# Patient Record
Sex: MALE
[De-identification: names, ages, dates, MRNs, and addresses within clinical notes are randomized per-mention and may not be internally consistent; named-entity substitution may affect disease eponyms.]

---

## 2018-01-11 ENCOUNTER — HOSPITAL ENCOUNTER (EMERGENCY)
Dept: HOSPITAL 42 - ED | Age: 28
LOS: 1 days | Discharge: HOME | End: 2018-01-12
Payer: COMMERCIAL

## 2018-01-11 VITALS — BODY MASS INDEX: 29.7 KG/M2

## 2018-01-11 VITALS — RESPIRATION RATE: 18 BRPM | TEMPERATURE: 98.5 F

## 2018-01-11 DIAGNOSIS — R10.11: Primary | ICD-10-CM

## 2018-01-11 LAB
ALBUMIN SERPL-MCNC: 4.5 G/DL (ref 3–4.8)
ALBUMIN/GLOB SERPL: 1.3 {RATIO} (ref 1.1–1.8)
ALT SERPL-CCNC: 46 U/L (ref 7–56)
APTT BLD: 27.8 SECONDS (ref 25.1–36.5)
AST SERPL-CCNC: 36 U/L (ref 17–59)
BASOPHILS # BLD AUTO: 0.02 K/MM3 (ref 0–2)
BASOPHILS NFR BLD: 0.2 % (ref 0–3)
BUN SERPL-MCNC: 13 MG/DL (ref 7–21)
CALCIUM SERPL-MCNC: 9.4 MG/DL (ref 8.4–10.5)
EOSINOPHIL # BLD: 0.1 10*3/UL (ref 0–0.7)
EOSINOPHIL NFR BLD: 0.4 % (ref 1.5–5)
ERYTHROCYTE [DISTWIDTH] IN BLOOD BY AUTOMATED COUNT: 12.1 % (ref 11.5–14.5)
GFR NON-AFRICAN AMERICAN: > 60
GRANULOCYTES # BLD: 9.41 10*3/UL (ref 1.4–6.5)
GRANULOCYTES NFR BLD: 75.8 % (ref 50–68)
HGB BLD-MCNC: 14.9 G/DL (ref 14–18)
INR PPP: 1.06 (ref 0.93–1.08)
LIPASE SERPL-CCNC: 72 U/L (ref 23–300)
LYMPHOCYTES # BLD: 2 10*3/UL (ref 1.2–3.4)
LYMPHOCYTES NFR BLD AUTO: 16.4 % (ref 22–35)
MCH RBC QN AUTO: 31 PG (ref 25–35)
MCHC RBC AUTO-ENTMCNC: 35.3 G/DL (ref 31–37)
MCV RBC AUTO: 87.7 FL (ref 80–105)
MONOCYTES # BLD AUTO: 0.9 10*3/UL (ref 0.1–0.6)
MONOCYTES NFR BLD: 7.2 % (ref 1–6)
PLATELET # BLD: 168 10^3/UL (ref 120–450)
PMV BLD AUTO: 9.4 FL (ref 7–11)
PROTHROMBIN TIME: 12.1 SECONDS (ref 9.4–12.5)
RBC # BLD AUTO: 4.81 10^6/UL (ref 3.5–6.1)
WBC # BLD AUTO: 12.4 10^3/UL (ref 4.5–11)

## 2018-01-11 PROCEDURE — 74177 CT ABD & PELVIS W/CONTRAST: CPT

## 2018-01-11 PROCEDURE — 85730 THROMBOPLASTIN TIME PARTIAL: CPT

## 2018-01-11 PROCEDURE — 83690 ASSAY OF LIPASE: CPT

## 2018-01-11 PROCEDURE — 71045 X-RAY EXAM CHEST 1 VIEW: CPT

## 2018-01-11 PROCEDURE — 96365 THER/PROPH/DIAG IV INF INIT: CPT

## 2018-01-11 PROCEDURE — 85025 COMPLETE CBC W/AUTO DIFF WBC: CPT

## 2018-01-11 PROCEDURE — 80053 COMPREHEN METABOLIC PANEL: CPT

## 2018-01-11 PROCEDURE — 99284 EMERGENCY DEPT VISIT MOD MDM: CPT

## 2018-01-11 PROCEDURE — 96375 TX/PRO/DX INJ NEW DRUG ADDON: CPT

## 2018-01-11 PROCEDURE — 85610 PROTHROMBIN TIME: CPT

## 2018-01-11 NOTE — ED PDOC
Arrival/HPI





- General


Chief Complaint: Abdominal Pain


Time Seen by Provider: 18 20:25


Historian: Patient





- History of Present Illness


Narrative History of Present Illness (Text): 





18 20:38


This 28 yo male with pmh chronic epigastric pain, presents to this ED c/o 

epigastric pain x 2 years.  Patient stated epigastric pain has worsen within 

last 2-3 weeks.  Patient has been taking ibuprofen for pain a few times before.

  Patient denies sob, cp, palpitation, rectal bleeding, fever, rash, dizziness, 

urinary symptoms, or abnormal gait.


Time/Duration: Other (see hpi)


Quality: Gas Like


Context: Home





Past Medical History





- Provider Review


Nursing Documentation Reviewed: Yes





- Infectious Disease


Hx of Infectious Diseases: None





- Psychiatric


Hx Psychophysiologic Disorder: No


Hx Substance Use: No





- Anesthesia


Hx Anesthesia: No





- Suicidal Assessment


Feels Threatened In Home Enviroment: No





Family/Social History





- Physician Review


Nursing Documentation Reviewed: Yes


Family/Social History: Other (noncontributory)


Smoking Status: Never Smoked


Hx Alcohol Use: No


Hx Substance Use: No





Allergies/Home Meds


Allergies/Adverse Reactions: 


Allergies





No Known Allergies Allergy (Verified 18 19:46)


 











Review of Systems





- Review of Systems


Constitutional: Normal.  absent: Fatigue, Weight Change, Fevers


Eyes: Normal


ENT: Normal


Respiratory: Normal


Cardiovascular: Normal


Gastrointestinal: Abdominal Pain.  absent: Nausea, Vomiting


Genitourinary Male: Normal


Musculoskeletal: Normal


Skin: Normal


Neurological: Normal


Endocrine: Normal


Hemo/Lymphatic: Normal


Psychiatric: Normal





Physical Exam


Vital Signs











  Temp Pulse Resp BP Pulse Ox


 


 18 19:46  98.5 F  80  18  130/84  97











Temperature: Afebrile


Blood Pressure: Normal


Pulse: Regular


Respiratory Rate: Normal


Appearance: Positive for: Well-Appearing, Non-Toxic, Comfortable


Pain Distress: None


Mental Status: Positive for: Alert and Oriented X 3





- Systems Exam


Head: Present: Atraumatic, Normocephalic


Pupils: Present: PERRL


Extroacular Muscles: Present: EOMI


Conjunctiva: Present: Normal


Mouth: Present: Moist Mucous Membranes


Neck: Present: Normal Range of Motion


Respiratory/Chest: Present: Clear to Auscultation, Good Air Exchange.  No: 

Respiratory Distress, Accessory Muscle Use


Cardiovascular: Present: Regular Rate and Rhythm, Normal S1, S2.  No: Murmurs


Abdomen: Present: Normal Bowel Sounds.  No: Tenderness, Distention, Peritoneal 

Signs, Rebound, Guarding


Back: Present: Normal Inspection


Upper Extremity: Present: Normal Inspection.  No: Cyanosis, Edema


Lower Extremity: Present: Normal Inspection.  No: Edema


Neurological: Present: GCS=15, CN II-XII Intact, Speech Normal


Skin: Present: Warm, Dry, Normal Color.  No: Rashes


Psychiatric: Present: Alert, Oriented x 3, Normal Insight, Normal Concentration





Medical Decision Making


ED Course and Treatment: 





18 22:27


Patient stated epigastric pain has worsen despite medication.  I recommended CT 

scan of Abdominal and Pelvis w/contrast.  Morphine and Zofran were ordered.


18 01:50


Re-evaluation.  Patient feels better.  Discussed results and plan with patient 

who expresses understanding.  All questions answered and there is agreement 

with the plan to discharge home with instructions.  Patient stable for 

discharge.  Return if symptoms persist or worsen.


Re-evaluation Time: 01:50


Reassessment Condition: Re-examined, Improved





- Lab Interpretations


Lab Results: 








 18 20:30 





 18 20:30 





 Lab Results





18 20:30: Sodium 139, Potassium 3.9, Chloride 100, Carbon Dioxide 28, 

Anion Gap 15, BUN 13, Creatinine 0.8, Est GFR (African Amer) > 60, Est GFR (Non-

Af Amer) > 60, Random Glucose 96, Calcium 9.4, Total Bilirubin 0.4, AST 36, ALT 

46, Alkaline Phosphatase 49, Total Protein 8.2, Albumin 4.5, Globulin 3.6, 

Albumin/Globulin Ratio 1.3, Lipase 72


18 20:30: PT 12.1, INR 1.06, APTT 27.8


18 20:30: WBC 12.4 H, RBC 4.81, Hgb 14.9, Hct 42.2, MCV 87.7, MCH 31.0, 

MCHC 35.3, RDW 12.1, Plt Count 168, MPV 9.4, Gran % 75.8 H, Lymph % (Auto) 16.4 

L, Mono % (Auto) 7.2 H, Eos % (Auto) 0.4 L, Baso % (Auto) 0.2, Gran # 9.41 H, 

Lymph # 2.0, Mono # 0.9 H, Eos # 0.1, Baso # 0.02








I have reviewed the lab results: Yes


Interpretation: No clinic. lab abnormalty





- RAD Interpretation


Narrative RAD Interpretations (Text): 





18 01:50


[facility] Division of Radiology  


 [address] [address]  


 Tel. no. [phone]   


 


 


Patient Name: ANGIE ROSS            Account #: [id number]  


Pt. Address: [address] [place] [address] Rec #: [id number]  


                    [place] [address]            Ordering Dr: Iva Salinas PA-C  


Pt Phone: [phone]                             Order Location: ED  


: 1990 Male Age: 27            Order #: [id number]                     

                 


             Accession #: [id number]  


Reason for exam: RUQ pain   


 


 


 


 


 


 CT Scan  


 


 


ABD   PELVIS IV CONTRAST ONLY                              Exam Date: 18  


 


This imaging exam was performed at [facility]  


EXAM:  


   CT Abdomen and Pelvis With Intravenous Contrast  


 


 EXAM DATE/TIME:  


   2018 10:25 PM  


 


 CLINICAL HISTORY:  


   27 years old, male; Pain; Abdominal pain; Flank; Right upper quadrant (ruq);

   


 Additional info: Ruq pain  


 


 TECHNIQUE:  


   Axial computed tomography images of the abdomen and pelvis with intravenous 

  


 contrast.  All CT scans at this facility use one or more dose reduction   


 techniques, viz.: automated exposure control; ma/kV adjustment per patient 

size   


 (including targeted exams where dose is matched to indication; i.e. head); or 

  


 iterative reconstruction technique.  


   Coronal and sagittal reformatted images were created and reviewed.  


 


 CONTRAST:  


   141 mL of OMNI 350 administered intravenously.  


 


 COMPARISON:  


   There are no prior studies for comparison.  


 


 FINDINGS:  


   Lower thorax:  Heart size is normal. There is dependent atelectasis at the   


 lung bases. There is minimal scarring at the lung bases.  


 


  ABDOMEN:  


   Liver:  There is fatty infiltration of the liver.  


   Gallbladder and bile ducts:  unremarkable  


   Pancreas:  unremarkable  


   Spleen:  Spleen is unremarkable.  There is a tiny accessory spleen in the   


 left upper quadrant.  


   Adrenals:  unremarkable  


   Kidneys and ureters:  unremarkable  


   Stomach and bowel:  Stomach is partially distended. Rotation is normal. 

There   


 are mildly dilated small bowel loops in the right upper quadrant. There is no 

  


 obstruction. There is fecalization of the terminal ileum. Appendix is   


 unremarkable.Colon is incompletely distended which limits evaluation.  


   Appendix:  See stomach and bowel  


 


  PELVIS:  


   Bladder:  unremarkable  


   Reproductive:  Seminal vesicles and prostate are unremarkable.  


 


  ABDOMEN and PELVIS:  


   Intraperitoneal space:  There is no free air or free fluid.  


   Bones/joints:  There are no acute osseous abnormalities.  


   Soft tissues:  unremarkable  


   Vasculature:  Vascular structures are unremarkable.  


   Lymph nodes:  There is no pathologic adenopathy.  


 


 IMPRESSION:  No acute solid visceral abnormality; mildly dilated small bowel   


 loops in the right upper quadrant suggest focal ileus; no CT findings of   


 appendicitis  


 


Radiology Orders: 








18 20:43


CHEST PORTABLE [RAD] Stat 





18 22:25


ABD & PELVIS IV CONTRAST ONLY [CT] Stat 














- Medication Orders


Current Medication Orders: 











Discontinued Medications





Al Hydrox/Mg Hydrox/Simethicone (Maalox Plus 30 Ml)  30 ml PO STAT STA


   Stop: 18 20:38


   Last Admin: 18 20:45  Dose: 30 ml





Belladonna/Phenobarbital (Donnatal Elixir)  5 ml PO STAT STA


   Stop: 18 20:38


   Last Admin: 18 20:45  Dose: 5 ml





Famotidine (Pepcid 20mg/50ml Premix)  20 mg in 50 mls @ 100 mls/hr IVPB STAT STA


   Stop: 18 20:55


   Last Admin: 18 20:36  Dose: 100 mls/hr





eMAR Start Stop


 Document     18 20:36  JOL  (Rec: 18 20:36  JOL  [id number])


     Intravenous Solution


      Start Date                                 18


      Start Time                                 20:36


      End Date                                   18


      End time                                   21:36


      Total Infusion Time                        60





Sodium Chloride (Sodium Chloride 0.9%)  1,000 mls @ 999 mls/hr IV .Q1H1M STA


   Stop: 18 21:26


   Last Admin: 18 20:36  Dose: 999 mls/hr





eMAR Start Stop


 Document     18 20:36  JOL  (Rec: 18 20:36  JOL  [id number])


     Intravenous Solution


      Start Date                                 18


      Start Time                                 20:36


      End Date                                   18


      End time                                   20:37


      Total Infusion Time                        1





Ketorolac Tromethamine (Toradol)  30 mg IVP STAT STA


   Stop: 18 23:59


Lidocaine HCl (Lidocaine 2% Viscous)  5 ml PO STAT STA


   Stop: 18 20:38


   Last Admin: 18 20:45  Dose: 5 ml





Morphine Sulfate (Morphine)  4 mg IVP STAT STA


   Stop: 18 22:27


   Last Admin: 18 22:42  Dose: 4 mg





MAR Pain Assessment


 Document     18 22:42  JOL  (Rec: 18 22:42  JOL  [id number])


     Pain Reassessment


      Is this a pain reassessment?               No


     Sleep


      Is patient sleeping during reassessment?   No


     Presence of Pain


      Presence of Pain                           Yes


     Pain Scale Used


      Pain Scale Used                            Numeric


     Description


      Intensity of Pain at present               8


IVP Administration


 Document     18 22:42  JOL  (Rec: 18 22:42  JOL  [id number])


     Charges for Administration


      # of IVP Administrations                   1





Ondansetron HCl (Zofran Inj)  4 mg IVP STAT STA


   Stop: 18 22:27


   Last Admin: 18 22:42  Dose: 4 mg





IVP Administration


 Document     18 22:42  JOL  (Rec: 18 22:42  JOL  [id number])


     Charges for Administration


      # of IVP Administrations                   1














Disposition/Present on Arrival





- Present on Arrival


Any Indicators Present on Arrival: No


History of DVT/PE: No


History of Uncontrolled Diabetes: No


Urinary Catheter: No


History of Decub. Ulcer: No


History Surgical Site Infection Following: None





- Disposition


Have Diagnosis and Disposition been Completed?: Yes


Diagnosis: 


 Nonspecific abdominal pain





Disposition: HOME/ ROUTINE


Disposition Time: 01:51


Patient Plan: Transfer To


Condition: GOOD


Discharge Instructions (ExitCare):  Abdominal Pain (ED)


Additional Instructions: 


Call private doctor for follow up visit in 1-2 days.  Take medication as 

instructed.  Return to emergency if symptoms worsen.  Avoid Ibuprofen, Naproxen

, fatty meals, coffee, or alcohol.  


Prescriptions: 


Famotidine [Pepcid] 40 mg PO DAILY #10 tablet


Referrals: 


Chris Johnson MD [Primary Care Provider] - Follow up with primary


Jasper Babin MD [Staff Provider] - Follow up with primary


Forms:  [employer] [facility] (English), WORK NOTE

## 2018-01-12 VITALS — DIASTOLIC BLOOD PRESSURE: 79 MMHG | OXYGEN SATURATION: 98 % | SYSTOLIC BLOOD PRESSURE: 128 MMHG | HEART RATE: 79 BPM

## 2018-01-12 NOTE — RAD
HISTORY:

epigastric pain  



COMPARISON:

08/04/2015 



FINDINGS:



LUNGS:

No active pulmonary disease.



PLEURA:

No significant pleural effusion identified, no pneumothorax apparent.



CARDIOVASCULAR:

Normal.



OSSEOUS STRUCTURES:

No significant abnormalities.



VISUALIZED UPPER ABDOMEN:

Normal.



OTHER FINDINGS:

None.



IMPRESSION:

No active disease.

## 2018-01-12 NOTE — CT
EXAM:

  CT Abdomen and Pelvis With Intravenous Contrast



EXAM DATE/TIME:

  1/11/2018 10:25 PM



CLINICAL HISTORY:

  27 years old, male; Pain; Abdominal pain; Flank; Right upper quadrant (ruq); 

Additional info: Ruq pain



TECHNIQUE:

  Axial computed tomography images of the abdomen and pelvis with intravenous 

contrast.  All CT scans at this facility use one or more dose reduction 

techniques, viz.: automated exposure control; ma/kV adjustment per patient size 

(including targeted exams where dose is matched to indication; i.e. head); or 

iterative reconstruction technique.

  Coronal and sagittal reformatted images were created and reviewed.



CONTRAST:

  141 mL of OMNI 350 administered intravenously.



COMPARISON:

  There are no prior studies for comparison.



FINDINGS:

  Lower thorax:  Heart size is normal. There is dependent atelectasis at the 

lung bases. There is minimal scarring at the lung bases.



 ABDOMEN:

  Liver:  There is fatty infiltration of the liver.

  Gallbladder and bile ducts:  unremarkable

  Pancreas:  unremarkable

  Spleen:  Spleen is unremarkable.  There is a tiny accessory spleen in the 

left upper quadrant.

  Adrenals:  unremarkable

  Kidneys and ureters:  unremarkable

  Stomach and bowel:  Stomach is partially distended. Rotation is normal. There 

are mildly dilated small bowel loops in the right upper quadrant. There is no 

obstruction. There is fecalization of the terminal ileum. Appendix is 

unremarkable.Colon is incompletely distended which limits evaluation.

  Appendix:  See stomach and bowel



 PELVIS:

  Bladder:  unremarkable

  Reproductive:  Seminal vesicles and prostate are unremarkable.



 ABDOMEN and PELVIS:

  Intraperitoneal space:  There is no free air or free fluid.

  Bones/joints:  There are no acute osseous abnormalities.

  Soft tissues:  unremarkable

  Vasculature:  Vascular structures are unremarkable.

  Lymph nodes:  There is no pathologic adenopathy.



IMPRESSION:  No acute solid visceral abnormality; mildly dilated small bowel 

loops in the right upper quadrant suggest focal ileus; no CT findings of 

appendicitis







Additional nonemergent findings as described above.